# Patient Record
Sex: FEMALE | Race: WHITE | Employment: FULL TIME | URBAN - METROPOLITAN AREA
[De-identification: names, ages, dates, MRNs, and addresses within clinical notes are randomized per-mention and may not be internally consistent; named-entity substitution may affect disease eponyms.]

---

## 2017-06-09 ENCOUNTER — OFFICE VISIT (OUTPATIENT)
Dept: FAMILY MEDICINE CLINIC | Facility: CLINIC | Age: 42
End: 2017-06-09

## 2017-06-09 VITALS
TEMPERATURE: 98 F | HEIGHT: 63 IN | HEART RATE: 76 BPM | BODY MASS INDEX: 26.05 KG/M2 | WEIGHT: 147 LBS | DIASTOLIC BLOOD PRESSURE: 60 MMHG | SYSTOLIC BLOOD PRESSURE: 110 MMHG

## 2017-06-09 DIAGNOSIS — N91.1 SECONDARY AMENORRHEA: Primary | ICD-10-CM

## 2017-06-09 DIAGNOSIS — N89.8 VAGINAL DISCHARGE: ICD-10-CM

## 2017-06-09 PROCEDURE — 84443 ASSAY THYROID STIM HORMONE: CPT | Performed by: FAMILY MEDICINE

## 2017-06-09 PROCEDURE — 87510 GARDNER VAG DNA DIR PROBE: CPT | Performed by: FAMILY MEDICINE

## 2017-06-09 PROCEDURE — 82670 ASSAY OF TOTAL ESTRADIOL: CPT | Performed by: FAMILY MEDICINE

## 2017-06-09 PROCEDURE — 99214 OFFICE O/P EST MOD 30 MIN: CPT | Performed by: FAMILY MEDICINE

## 2017-06-09 PROCEDURE — 84702 CHORIONIC GONADOTROPIN TEST: CPT | Performed by: FAMILY MEDICINE

## 2017-06-09 PROCEDURE — 83002 ASSAY OF GONADOTROPIN (LH): CPT | Performed by: FAMILY MEDICINE

## 2017-06-09 PROCEDURE — 87660 TRICHOMONAS VAGIN DIR PROBE: CPT | Performed by: FAMILY MEDICINE

## 2017-06-09 PROCEDURE — 83001 ASSAY OF GONADOTROPIN (FSH): CPT | Performed by: FAMILY MEDICINE

## 2017-06-09 PROCEDURE — 87480 CANDIDA DNA DIR PROBE: CPT | Performed by: FAMILY MEDICINE

## 2017-06-09 PROCEDURE — 36415 COLL VENOUS BLD VENIPUNCTURE: CPT | Performed by: FAMILY MEDICINE

## 2017-06-12 ENCOUNTER — TELEPHONE (OUTPATIENT)
Dept: FAMILY MEDICINE CLINIC | Facility: CLINIC | Age: 42
End: 2017-06-12

## 2017-06-12 RX ORDER — FLUCONAZOLE 150 MG/1
150 TABLET ORAL ONCE
Qty: 2 TABLET | Refills: 0 | Status: SHIPPED | OUTPATIENT
Start: 2017-06-12 | End: 2017-06-12

## 2017-06-12 RX ORDER — METRONIDAZOLE 500 MG/1
500 TABLET ORAL 2 TIMES DAILY
Qty: 14 TABLET | Refills: 0 | Status: SHIPPED | OUTPATIENT
Start: 2017-06-12 | End: 2017-06-19

## 2017-06-12 NOTE — TELEPHONE ENCOUNTER
----- Message from Bjorn Alexandra MD sent at 6/11/2017 10:58 PM CDT -----  Please notify pt her vaginal testing came back negative for gonorrhea, chlamydia and trich (the STIs), but positive for 2 NON-STI infections called BV and yeast. We can treat with [facility]

## 2017-06-13 NOTE — PROGRESS NOTES
Mariano Hardin is a 36year old female. HPI:   Pt is here for eval of 2 things:    1) she hasn't had a period in 4-5 months. Doubts she's pregnant but hasn't done a pregnancy test. Has felt possibly a few hot flashes a few times but not sure.  No vagina in well developed, well nourished,in no apparent distress  SKIN: no rashes,no suspicious lesions  NECK: supple,no adenopathy,no JVD, no thyromegaly  LUNGS: clear to auscultation  CARDIO: RRR without murmur  : normal external vagina; speculum exam reveals no

## 2017-07-07 ENCOUNTER — TELEPHONE (OUTPATIENT)
Dept: FAMILY MEDICINE CLINIC | Facility: CLINIC | Age: 42
End: 2017-07-07

## 2017-07-07 DIAGNOSIS — N91.1 SECONDARY AMENORRHEA: Primary | ICD-10-CM

## 2017-07-07 NOTE — TELEPHONE ENCOUNTER
Patient advised. Nurse appointment scheduled.    Future Appointments  Date Time Provider Richei Nuno   7/14/2017 10:00 AM  [facility],[address]Nd [facility] [place]

## 2017-07-07 NOTE — TELEPHONE ENCOUNTER
The next step is to do 2 more blood tests to eval for less common causes of skipped periods (orders placed). If those are normal then we try to jumpstart her period with a burst of progesterone.

## 2017-07-07 NOTE — TELEPHONE ENCOUNTER
Patient was recently seen and discussed with Dr. Rich Alexis the fact that she is not getting her cycle. Dr. Rich Alexis wanted patient to call with an update this month. Patient did not get her cycle again this month. She is wondering what to do now.  I explained to sonja

## 2017-07-07 NOTE — TELEPHONE ENCOUNTER
Called and notified patient that I would send the message to [facility], patient is agreeable to wait until Monday.

## 2017-07-15 ENCOUNTER — NURSE ONLY (OUTPATIENT)
Dept: FAMILY MEDICINE CLINIC | Facility: CLINIC | Age: 42
End: 2017-07-15

## 2017-07-15 DIAGNOSIS — N91.1 SECONDARY AMENORRHEA: ICD-10-CM

## 2017-07-15 LAB
IMMUNOGLOBULIN A: 364 MG/DL (ref 70–312)
PROLACTIN: 20.9 NG/ML

## 2017-07-15 PROCEDURE — 82784 ASSAY IGA/IGD/IGG/IGM EACH: CPT | Performed by: FAMILY MEDICINE

## 2017-07-15 PROCEDURE — 84146 ASSAY OF PROLACTIN: CPT | Performed by: FAMILY MEDICINE

## 2017-07-15 PROCEDURE — 83516 IMMUNOASSAY NONANTIBODY: CPT | Performed by: FAMILY MEDICINE

## 2017-07-15 PROCEDURE — 86255 FLUORESCENT ANTIBODY SCREEN: CPT | Performed by: FAMILY MEDICINE

## 2017-07-15 PROCEDURE — 36415 COLL VENOUS BLD VENIPUNCTURE: CPT | Performed by: FAMILY MEDICINE

## 2017-07-17 LAB
GLIAD (DEAMIDATED) AB, IGA: NEGATIVE
GLIAD (DEAMIDATED) AB, IGG: NEGATIVE
TISSUE TRANSGLUTAMINASE AB,IGA: 3.6 U/ML (ref ?–15)
TISSUE TRANSGLUTAMINASE IGA QUALITATIVE: NEGATIVE

## 2017-07-20 ENCOUNTER — TELEPHONE (OUTPATIENT)
Dept: FAMILY MEDICINE CLINIC | Facility: CLINIC | Age: 42
End: 2017-07-20

## 2017-07-20 RX ORDER — MEDROXYPROGESTERONE ACETATE 10 MG/1
10 TABLET ORAL DAILY
Qty: 10 TABLET | Refills: 0 | Status: SHIPPED | OUTPATIENT
Start: 2017-07-20 | End: 2018-07-07 | Stop reason: ALTCHOICE

## 2017-07-20 NOTE — TELEPHONE ENCOUNTER
Patient notified and verbalized understanding. Patient states she has not started her period yet. She is agreeable to trying Provera 10 mg daily x 10 days.     Script sent to [facility] 07/91 per patient request.  Patient will call 7 days after s

## 2017-07-20 NOTE — TELEPHONE ENCOUNTER
Notes Recorded by Licha Zelaya MD on 7/20/2017 at 12:14 PM CDT  Please notify pt her final blood test (for celiac) is negative.  If she hasn't gotten her period yet I'd like to jumpstart it with provera 10mg daily for 10 days (should get a withdrawal blee

## 2018-07-07 ENCOUNTER — OFFICE VISIT (OUTPATIENT)
Dept: FAMILY MEDICINE CLINIC | Facility: CLINIC | Age: 43
End: 2018-07-07

## 2018-07-07 VITALS
RESPIRATION RATE: 16 BRPM | HEIGHT: 63 IN | DIASTOLIC BLOOD PRESSURE: 80 MMHG | OXYGEN SATURATION: 98 % | WEIGHT: 140 LBS | BODY MASS INDEX: 24.8 KG/M2 | HEART RATE: 74 BPM | TEMPERATURE: 97 F | SYSTOLIC BLOOD PRESSURE: 120 MMHG

## 2018-07-07 DIAGNOSIS — J30.1 SEASONAL ALLERGIC RHINITIS DUE TO POLLEN: ICD-10-CM

## 2018-07-07 DIAGNOSIS — J01.40 ACUTE NON-RECURRENT PANSINUSITIS: Primary | ICD-10-CM

## 2018-07-07 PROCEDURE — 99214 OFFICE O/P EST MOD 30 MIN: CPT | Performed by: FAMILY MEDICINE

## 2018-07-07 RX ORDER — MOMETASONE 50 UG/1
2 SPRAY, METERED NASAL DAILY
Qty: 1 INHALER | Refills: 3 | Status: SHIPPED | OUTPATIENT
Start: 2018-07-07 | End: 2019-12-27

## 2018-07-07 RX ORDER — AMOXICILLIN AND CLAVULANATE POTASSIUM 875; 125 MG/1; MG/1
1 TABLET, FILM COATED ORAL 2 TIMES DAILY
Qty: 14 TABLET | Refills: 0 | Status: SHIPPED | OUTPATIENT
Start: 2018-07-07 | End: 2018-07-14

## 2018-07-07 NOTE — PROGRESS NOTES
HPI:   Binta Calvillo is a 39year old female who presents for upper respiratory symptoms for ~10 days.  Symptoms include started with \"pressure\" in forehead sinus, sudafed helped a little, but now has a lot of post nasal drip and some aeer pressure and s developed, well nourished,in no apparent distress; sounds congested  SKIN: no rashes,no suspicious lesions  EYES: EOMI, conjunctiva are clear, no drainage; + pale puffylower lids with allergic shiners and allergic salute  HEENT: atraumatic, normocephalic,T

## 2019-12-27 ENCOUNTER — OFFICE VISIT (OUTPATIENT)
Dept: FAMILY MEDICINE CLINIC | Facility: CLINIC | Age: 44
End: 2019-12-27
Payer: COMMERCIAL

## 2019-12-27 VITALS
RESPIRATION RATE: 18 BRPM | OXYGEN SATURATION: 98 % | WEIGHT: 148 LBS | HEART RATE: 85 BPM | TEMPERATURE: 97 F | BODY MASS INDEX: 26.22 KG/M2 | HEIGHT: 63 IN | DIASTOLIC BLOOD PRESSURE: 86 MMHG | SYSTOLIC BLOOD PRESSURE: 120 MMHG

## 2019-12-27 DIAGNOSIS — Z12.39 SCREENING BREAST EXAMINATION: ICD-10-CM

## 2019-12-27 DIAGNOSIS — Z13.0 SCREENING, ANEMIA, DEFICIENCY, IRON: ICD-10-CM

## 2019-12-27 DIAGNOSIS — Z13.220 LIPID SCREENING: ICD-10-CM

## 2019-12-27 DIAGNOSIS — Z12.4 CERVICAL CANCER SCREENING: ICD-10-CM

## 2019-12-27 DIAGNOSIS — Z13.29 THYROID DISORDER SCREEN: ICD-10-CM

## 2019-12-27 DIAGNOSIS — Z00.00 ROUTINE HISTORY AND PHYSICAL EXAMINATION OF ADULT: Primary | ICD-10-CM

## 2019-12-27 DIAGNOSIS — L85.3 DRY SKIN DERMATITIS: ICD-10-CM

## 2019-12-27 DIAGNOSIS — R92.2 DENSE BREASTS: ICD-10-CM

## 2019-12-27 DIAGNOSIS — Z12.31 SCREENING MAMMOGRAM, ENCOUNTER FOR: ICD-10-CM

## 2019-12-27 DIAGNOSIS — E55.9 VITAMIN D DEFICIENCY: ICD-10-CM

## 2019-12-27 DIAGNOSIS — Z13.1 DIABETES MELLITUS SCREENING: ICD-10-CM

## 2019-12-27 PROCEDURE — 83036 HEMOGLOBIN GLYCOSYLATED A1C: CPT | Performed by: FAMILY MEDICINE

## 2019-12-27 PROCEDURE — 87624 HPV HI-RISK TYP POOLED RSLT: CPT | Performed by: FAMILY MEDICINE

## 2019-12-27 PROCEDURE — 80050 GENERAL HEALTH PANEL: CPT | Performed by: FAMILY MEDICINE

## 2019-12-27 PROCEDURE — 99396 PREV VISIT EST AGE 40-64: CPT | Performed by: FAMILY MEDICINE

## 2019-12-27 PROCEDURE — 82306 VITAMIN D 25 HYDROXY: CPT | Performed by: FAMILY MEDICINE

## 2019-12-27 PROCEDURE — 36415 COLL VENOUS BLD VENIPUNCTURE: CPT | Performed by: FAMILY MEDICINE

## 2019-12-27 PROCEDURE — 88175 CYTOPATH C/V AUTO FLUID REDO: CPT | Performed by: FAMILY MEDICINE

## 2019-12-27 PROCEDURE — 80061 LIPID PANEL: CPT | Performed by: FAMILY MEDICINE

## 2019-12-27 NOTE — PROGRESS NOTES
HPI:   Hernan Casillas is a 38year old female who presents for a complete physical exam.      Patient complains of extremely dry skin, figures it's from washing her hands os much at work.   Has applied vaseline at night, cerave, golf bond, helps but doesn't Heart Disorder Father         stroke in mid 45s   • Diabetes Father    • Diabetes Mother    • Hypertension Mother    • Other (Other) Maternal Grandmother         Alzheimers   • Other (Other) Paternal Grandmother         arthritis   • Other (Other) Brother auscultation  CARDIO: RRR without murmur  GI: good BS's,no masses, HSM or tenderness  :introitus is normal, no pathologic appearing discharge,cervix is grossly normal,no adnexal masses or tenderness  MUSCULOSKELETAL: back is not tender, FROM of UEs and L VENIPUNCTURE  -     LIPID PANEL;  Future    Thyroid disorder screen  -     VENIPUNCTURE  -     ASSAY, THYROID STIM HORMONE; Future    Vitamin D deficiency  -     VENIPUNCTURE  -     VITAMIN D, 25-HYDROXY; Future    Dry skin dermatitis  If eczema component (

## 2019-12-29 NOTE — PROGRESS NOTES
Please notify patient good news, labs look good for most part, including blood counts,  kidneys, liver, electrolytes, thyroid, cholesterol. BLood sugar mildly elevated just barely into prediabetic range. Keep working on healthy lifestyle habits!   Vitamin

## 2019-12-30 ENCOUNTER — HOSPITAL ENCOUNTER (OUTPATIENT)
Dept: MAMMOGRAPHY | Age: 44
Discharge: HOME | End: 2019-12-30
Attending: FAMILY MEDICINE
Payer: COMMERCIAL

## 2019-12-30 DIAGNOSIS — Z12.31 SCREENING MAMMOGRAM, ENCOUNTER FOR: ICD-10-CM

## 2019-12-30 DIAGNOSIS — R92.2 DENSE BREASTS: ICD-10-CM

## 2019-12-30 PROCEDURE — 77067 SCR MAMMO BI INCL CAD: CPT | Performed by: FAMILY MEDICINE

## 2019-12-30 PROCEDURE — 77063 BREAST TOMOSYNTHESIS BI: CPT | Performed by: FAMILY MEDICINE

## 2019-12-31 ENCOUNTER — TELEPHONE (OUTPATIENT)
Dept: FAMILY MEDICINE CLINIC | Facility: CLINIC | Age: 44
End: 2019-12-31

## 2019-12-31 DIAGNOSIS — E55.9 VITAMIN D DEFICIENCY: Primary | ICD-10-CM

## 2019-12-31 RX ORDER — ERGOCALCIFEROL (VITAMIN D2) 1250 MCG
50000 CAPSULE ORAL WEEKLY
Qty: 12 CAPSULE | Refills: 0 | Status: SHIPPED | OUTPATIENT
Start: 2019-12-31 | End: 2020-01-30

## 2019-12-31 NOTE — TELEPHONE ENCOUNTER
----- Message from Nixon Velasquez MD sent at 2019  5:55 PM CST -----  Please notify patient good news, labs look good for most part, including blood counts,  kidneys, liver, electrolytes, thyroid, cholesterol.  BLood sugar mildly elevated just barely i

## 2019-12-31 NOTE — TELEPHONE ENCOUNTER
Patient advised. Verbalizes understanding. Future order placed.    Medication sent to [facility] per patient's request.

## 2020-03-14 NOTE — TELEPHONE ENCOUNTER
Last OV 12/27/19  Mometasone Furoate last refilled 7-7-2018  1 inhaler  3 refills    Routed to Dr Grey Stephenson to advise as it has not been ordered since 7/2018 and reported not taking at 12/27/19 OV

## 2020-03-15 RX ORDER — MOMETASONE 50 UG/1
2 SPRAY, METERED NASAL DAILY
Qty: 1 INHALER | Refills: 3 | Status: SHIPPED | OUTPATIENT
Start: 2020-03-15 | End: 2021-05-25

## 2020-10-05 ENCOUNTER — TELEPHONE (OUTPATIENT)
Dept: FAMILY MEDICINE CLINIC | Facility: CLINIC | Age: 45
End: 2020-10-05

## 2020-10-05 NOTE — TELEPHONE ENCOUNTER
Pt called she is feeling more tired lately. She was taking vitamin D awhile ago but then stopped. She is wanting to know if she could get her vitamin D level check? Also she some leftover capsules and is wondering if she can start taking those again?

## 2020-10-05 NOTE — TELEPHONE ENCOUNTER
She could get level checked again, but it's an expensive test if insurance doesn't cover it, could take a month of the weekly prescription if she has that and then transition to 5000IU daily OR just take OTC 5000IU daily.   If not feeling better in 2 months

## 2020-10-05 NOTE — TELEPHONE ENCOUNTER
Patient returned call. patient notified and verbalized understanding. Will take Vitamin D and let [facility] know if not improving in 2 months.   Sooner if worsening

## 2020-11-03 ENCOUNTER — MED REC SCAN ONLY (OUTPATIENT)
Dept: FAMILY MEDICINE CLINIC | Facility: CLINIC | Age: 45
End: 2020-11-03

## 2021-05-25 ENCOUNTER — TELEPHONE (OUTPATIENT)
Dept: FAMILY MEDICINE CLINIC | Facility: CLINIC | Age: 46
End: 2021-05-25

## 2021-05-25 RX ORDER — MOMETASONE 50 UG/1
2 SPRAY, METERED NASAL DAILY
Qty: 17 G | Refills: 3 | Status: SHIPPED | OUTPATIENT
Start: 2021-05-25 | End: 2021-05-29

## 2021-05-29 ENCOUNTER — TELEPHONE (OUTPATIENT)
Dept: FAMILY MEDICINE CLINIC | Facility: CLINIC | Age: 46
End: 2021-05-29

## 2021-05-29 RX ORDER — FLUTICASONE PROPIONATE 50 MCG
2 SPRAY, SUSPENSION (ML) NASAL DAILY
Qty: 16 G | Refills: 3 | Status: SHIPPED | OUTPATIENT
Start: 2021-05-29 | End: 2022-05-24

## 2023-08-04 NOTE — TELEPHONE ENCOUNTER
Received fax from pharmacy stating: Alternative requested: Prior Auth denied by insurance. Please send in alternative such as Fluticasone. Per written from Dr. Britney Larkin, ok to send in fluticasone with same instructions as mometasone. New med sent. 1 Principal Discharge DX:	Reactive airway disease

## (undated) NOTE — MR AVS SNAPSHOT
After Visit Summary   12/27/2019    Kristie Mclaughlin    MRN: [id number]           Visit Information     Date & Time  12/27/2019  3:30 PM Provider  Colin Aguilar MD [address] Dept[address]  Phone  [phone] COMP METABOLIC PANEL (14) [[id number] CUSTOM]  12/27/2019 (Approximate) 12/26/2020    HEMOGLOBIN A1C [[id number] CUSTOM]  12/27/2019 (Approximate) 12/26/2020    HPV HIGH RISK , THIN PREP COLLECTION [[id number] CUSTOM]  12/27/2019 12/27/2020    LIPID PANEL [[id number] increments are effective and add up over the week   2 ½ hours per week – spread out over time Use a lucia to keep you motivated   Don’t forget strength training with weights and resistance Set goals and track your progress   You don’t need to join a gym. WALK-IN CARE  Primary Care Providers  Treatment for acute illness   or injury that are   non-life-threatening.   Also available by appointment     Average cost  $70*   [facility]    [place] – [address] [facility]

## (undated) NOTE — MR AVS SNAPSHOT
[address] [facility] [place] [id number]  [phone]               Thank you for choosing us for your health care visit with Surinder Barry MD.  We are glad to serve you and happy to provide you with this sum Component Value Standard Range & Units    Hcg Quantitative <1.0 <=1.0 mIU/mL      Negative      <2.0 mIU/mL   Indeterminate 2.0- 50.0 mIU/mL   (Repeat suggested in 24- 48 hours)     Ranges with normal pregnancies:   0.2- 1 week 5.0 50.0 mIU/mL   1- 2 wee Healthy Diet and Regular Exercise  [employer] [address] [place], [place] [address] for making healthy food choices  -   Enjoy your food, but eat less. Fully enjoy your food when eating. Don’t eat while distracted and slow down. Avoid over sized portions.    Sweta Anderson

## (undated) NOTE — LETTER
09/04/20            Katherine Cleveland   [address] [place] [id number]           Dear Katherine Cleveland     Our records indicate that you have outstanding lab work and or testing that was ordered for you and has not yet been completed:   [place]